# Patient Record
Sex: MALE | Race: WHITE | URBAN - METROPOLITAN AREA
[De-identification: names, ages, dates, MRNs, and addresses within clinical notes are randomized per-mention and may not be internally consistent; named-entity substitution may affect disease eponyms.]

---

## 2017-03-06 ENCOUNTER — ALLIED HEALTH/NURSE VISIT (OUTPATIENT)
Dept: TRANSPLANT | Facility: CLINIC | Age: 57
End: 2017-03-06
Attending: INTERNAL MEDICINE
Payer: COMMERCIAL

## 2017-03-06 ENCOUNTER — RESULTS ONLY (OUTPATIENT)
Dept: OTHER | Facility: CLINIC | Age: 57
End: 2017-03-06

## 2017-03-06 VITALS
SYSTOLIC BLOOD PRESSURE: 126 MMHG | DIASTOLIC BLOOD PRESSURE: 73 MMHG | HEART RATE: 70 BPM | RESPIRATION RATE: 18 BRPM | WEIGHT: 153.7 LBS | OXYGEN SATURATION: 94 % | TEMPERATURE: 96.9 F

## 2017-03-06 DIAGNOSIS — C90.00 MULTIPLE MYELOMA NOT HAVING ACHIEVED REMISSION (H): Primary | ICD-10-CM

## 2017-03-06 DIAGNOSIS — Z71.9 ENCOUNTER FOR COUNSELING: Primary | ICD-10-CM

## 2017-03-06 PROCEDURE — 36415 COLL VENOUS BLD VENIPUNCTURE: CPT

## 2017-03-06 PROCEDURE — 81370 HLA I & II TYPING LR: CPT | Performed by: INTERNAL MEDICINE

## 2017-03-06 PROCEDURE — 81376 HLA II TYPING 1 LOCUS LR: CPT | Performed by: INTERNAL MEDICINE

## 2017-03-06 PROCEDURE — 99213 OFFICE O/P EST LOW 20 MIN: CPT | Mod: ZF

## 2017-03-06 RX ORDER — DOCUSATE SODIUM 100 MG/1
CAPSULE, LIQUID FILLED ORAL 2 TIMES DAILY PRN
COMMUNITY

## 2017-03-06 RX ORDER — CHLORAL HYDRATE 500 MG
2 CAPSULE ORAL DAILY
COMMUNITY

## 2017-03-06 RX ORDER — OXYCODONE HYDROCHLORIDE 5 MG/1
TABLET ORAL EVERY 4 HOURS PRN
COMMUNITY

## 2017-03-06 RX ORDER — SILDENAFIL 100 MG/1
TABLET, FILM COATED ORAL DAILY PRN
COMMUNITY

## 2017-03-06 RX ORDER — AMOXICILLIN 250 MG
1-2 CAPSULE ORAL DAILY
COMMUNITY

## 2017-03-06 RX ORDER — ATORVASTATIN CALCIUM 20 MG/1
20 TABLET, FILM COATED ORAL DAILY
COMMUNITY

## 2017-03-06 RX ORDER — VITAMIN E 268 MG
400 CAPSULE ORAL
COMMUNITY

## 2017-03-06 RX ORDER — TIZANIDINE HYDROCHLORIDE 4 MG/1
4 CAPSULE, GELATIN COATED ORAL
COMMUNITY

## 2017-03-06 ASSESSMENT — PAIN SCALES - GENERAL: PAINLEVEL: MILD PAIN (3)

## 2017-03-06 NOTE — PROGRESS NOTES
Blood and Marrow Transplant   New Transplant Visit with   Clinical     Assessment completed on 3/6/2017 of living situation, support system, financial status, functional status, coping, stressors, need for resources and social work intervention provided as needed. Information for this assessment was provided by pt and pt's spouse-Abimael patel in addition to medical chart review and consultation with medical team.     Present:  Patient: Jonathan Frederick  Spouse: Abimael Pardo  : TAY Mena, [facility]    Medical Team   Nurse Coordinator: Xiomara Mckenzie RN  BMT Physician: Luis Antonio Orosco MD  Referring Physician: Leonel Berry MD    Presenting Information:  Pt is a 56 year old male diagnosed with Multiple Myeloma. Pt presents for autologous stem cell transplant discussion.    Contact Information:  Cell Phone: [phone]  Home Phone: [phone]    Special Needs: No needs identified at this time. Pt lives in [place] which is within the required distance of the hospital. Pt does not need to relocate.     Family Information:   Spouse: Abimael Pardo  Parents: Mother lives in the area; Father   Siblings: 2 Sisters  Children: None    Education/Employment:  Currently employed: Yes  Employer: [employer]    Spouse/Partner Employed: Yes  Employer: [employer]    Finances/Insurance: Pt explained that he received a call from [employer]Mela James stating that his insurance may not be in-network with [facility] and pt may have to go to [facility]. [employer]  is going to call pt back and provide update on insurance information.    Additionally, pt also discussed that he is on Short Term Disability, unpaid, through his work; however, he is being paid through PTO. [employer] discussed [employer] and provided blank application. SW encouraged pt provide application to pt's  Oncology Clinic . SW explained that the yasmani is based on tx area and income based.     Source of Income: PTO    [employer] discussed yasmani options and asked pt to let SW know if they would like to apply in the future. SW provided information regarding the insurance authorization process and the role of the BMT financial case-mangers. SW provided contact info for the [employer] financial case-managers and referred pt to them for future insurance questions.     Caregiver:   SW discussed with the pt and pt's spouse-Abimael the caregiver role and expectation at length. Pt is agreeable to having a full time caregiver for the minimum of 30 days until cleared by the BMT Physician. Pt's identified caregiver is pt's spouse-Abimael. Pt and pt's spouse did not have any concerns or feel there were any limitations to this requirement. Caregiver education and information provided. No caregiver concerns identified.     Healthcare Directive: No. SW provided education and forms. SW encouraged pt to have discussions with their family regarding their health care wishes.     Resources Provided:  BMT Information Book  BMT Resources Packet  Healthcare Directive  Transplant Unit Description and Information     Identified Concerns:  No concerns identified at this time.     Summary:  Pt presents to [facility] regarding an autologous stem cell transplant. Pt and pt's spouse asked good/appropriate questions regarding psychosocial factors related to BMT; all questions were addressed. Pt presented as calm and pleasant. No caregiver concerns identified. Pt and pt's spouse would not need to relocate if pt was able to receive BMT at [facility]. Pt is waiting to hear from [employer]Mela James to see if pt's insurance is in-network.    Plan:   SW provided contact information and encouraged pt to contact [employer] with any additional questions, concerns, resources and/or for support. [employer] will  continue to follow pt to provide support and guidance with resources as needed.     TAY Mena, [facility]  Phone: [phone]  Pager: [phone]

## 2017-03-06 NOTE — MR AVS SNAPSHOT
"              After Visit Summary   3/6/2017    Jonathan Frederick    MRN: [id number]           Patient Information     Date Of Birth          1960        Visit Information        Provider Department      3/6/2017 7:30 AM Luis Antonio Orosco MD [facility] Blood and Marrow Transplant        Today's Diagnoses     Multiple myeloma not having achieved remission (H)    -  1          Clinics and Surgery Center ([facility]  [address] [place] [address]  Phone: [phone]  Clinic Hours:   Monday-Friday:    8am to 4:30pm   Weekends and holidays:    8am to noon (in general)  If your fever is 100.5  or greater,   call the clinic.  After hours call the   hospital at [phone] or   [phone]. Ask for the BMT   fellow for pediatric or adult patients            Follow-ups after your visit        Who to contact     If you have questions or need follow up information about today's clinic visit or your schedule please contact [facility] BLOOD AND MARROW TRANSPLANT directly at [phone].  Normal or non-critical lab and imaging results will be communicated to you by MOOVIAhart, letter or phone within 4 business days after the clinic has received the results. If you do not hear from us within 7 days, please contact the clinic through [employer] or phone. If you have a critical or abnormal lab result, we will notify you by phone as soon as possible.  Submit refill requests through [employer] or call your pharmacy and they will forward the refill request to us. Please allow 3 business days for your refill to be completed.          Additional Information About Your Visit        MOOVIAhart Information     [employer] lets you send messages to your doctor, view your test results, renew your prescriptions, schedule appointments and more. To sign up, go to [email or web address] . Click on \"Log in\" on the left side of the screen, which will take you to the Welcome page. Then click on \"Sign up Now\" on the right side of the page.     You " will be asked to enter the access code listed below, as well as some personal information. Please follow the directions to create your username and password.     Your access code is: [email or web address]  Expires: 2017  6:30 AM     Your access code will  in 90 days. If you need help or a new code, please call your [place] clinic or [phone].        Care EveryWhere ID     This is your Care EveryWhere ID. This could be used by other organizations to access your [place] medical records  [id number]        Your Vitals Were     Pulse Temperature Respirations Pulse Oximetry          70 96.9  F (36.1  C) (Oral) 18 94%         Blood Pressure from Last 3 Encounters:   17 126/73    Weight from Last 3 Encounters:   17 69.7 kg (153 lb 11.2 oz)              We Performed the Following     HLA Typing Complete BMT Recipient        Recent Review Flowsheet Data     There is no flowsheet data to display.               Primary Care Provider    None Specified       No primary provider on file.        Thank you!     Thank you for choosing [facility] BLOOD AND MARROW TRANSPLANT  for your care. Our goal is always to provide you with excellent care. Hearing back from our patients is one way we can continue to improve our services. Please take a few minutes to complete the written survey that you may receive in the mail after your visit with us. Thank you!             Your Updated Medication List - Protect others around you: Learn how to safely use, store and throw away your medicines at www.disposemymeds.org.          This list is accurate as of: 3/6/17 11:59 PM.  Always use your most recent med list.                   Brand Name Dispense Instructions for use    COLACE 100 MG capsule   Generic drug:  docusate sodium      Take by mouth 2 times daily as needed for constipation       fish oil-omega-3 fatty acids 1000 MG capsule      Take 2 g by mouth daily 4 caps by mouth once daily       GLUCOPHAGE XR PO      Take 500 mg by  mouth daily       LIPITOR 20 MG tablet   Generic drug:  atorvastatin      Take 20 mg by mouth daily       * MS CONTIN PO      Take 15 mg by mouth every 12 hours       * MORPHINE SULFATE PO      Take 30 mg by mouth Immediate release. 1 tablet by mouth 2 times daily       ROXICODONE 5 MG IR tablet   Generic drug:  oxyCODONE      Take by mouth every 4 hours as needed for moderate to severe pain 1-2 tablets as needed for pain       senna-docusate 8.6-50 MG per tablet    SENOKOT-S;PERICOLACE     Take 1-2 tablets by mouth daily       SG ENTERIC COATED ASPIRIN PO      Take 325 mg by mouth Take 1 tablet by mouth 2 times daily with meals.       VIAGRA 100 MG cap/tab   Generic drug:  sildenafil      Take by mouth daily as needed for erectile dysfunction       vitamin E 400 UNIT capsule      Take 400 Units by mouth Take twice weekly, on Tuesdays and Fridays.       ZANAFLEX 4 MG capsule   Generic drug:  tiZANidine      Take 4 mg by mouth Take 1 tablet by mouth every 6 hours  As needed for muscle spasms.       * Notice:  This list has 2 medication(s) that are the same as other medications prescribed for you. Read the directions carefully, and ask your doctor or other care provider to review them with you.

## 2017-03-06 NOTE — LETTER
3/6/2017       RE: Jonathan Frederick  [address]  [place] [address]     Dear Colleague,    Thank you for referring your patient, Jonathan Frederick, to the [facility] BLOOD AND MARROW TRANSPLANT at [facility]. Please see a copy of my visit note below.    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  FISH neg for OI41h27.1 loss, loss of T006042 (13q14.3), IgH reaarangement  On first cycle of RVD    , a very pleasant person, came to the clinic for his first vist to sergio about hematopoietic cell transplantation (HCT).  He had had pain pain in some different places in his body. MRI was obtained in jan 2016 showing extensive osseous lesions throughout the throsic vertebrae, pathologic wedge-shaped compression fracture at T3, similar destruction at T11, no spinal canal compromise. Bone survey showed B rib fractures, innumerable lytic lesions throughout the axial and appendicular skeleton. On 1/31/2017 serum KLC 1.51, Lambda Light chain 20.7 mg/dL, K/L ratio 0.07. Serum M spike 2.8, 24-h urine 768 mg protein, M spike 30%No results found for: Serum albumin 3.6, T.pro 9.7, T.megan 0.7, Fts WNL, WBC 7000, Hb 10.8, lt 227,000.  cre 1.53, uric acid 8.5    BM biopsy was done showing hypercellualr marrow (60-80%) with 60-70% plasma cells postive for CD 38, 45, lambda    ROS: + decreased appetite, pain in the R toe (gout) improving, decreased energy, pain in back and in ribs, other sysmtes are negative.    PMH: Type II DM on metformin  Hypercholesterolemia    Current Outpatient Prescriptions   Medication     SG ENTERIC COATED ASPIRIN PO     atorvastatin (LIPITOR) 20 MG tablet     docusate sodium (COLACE) 100 MG capsule     fish oil-omega-3 fatty acids 1000 MG capsule     MetFORMIN HCl (GLUCOPHAGE XR PO)     Morphine Sulfate (MS  CONTIN PO)     MORPHINE SULFATE PO     oxyCODONE (ROXICODONE) 5 MG IR tablet     senna-docusate (SENOKOT-S;PERICOLACE) 8.6-50 MG per tablet     sildenafil (VIAGRA) 100 MG cap/tab     tiZANidine (ZANAFLEX) 4 MG capsule     vitamin E 400 UNIT capsule     No current facility-administered medications for this visit.        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 Agressive means also without therapy it would threaten his life in months-years due to complications including bone marrow related (neutropenia, anemia, thormbocytopenia), infections, bone-related, kidney-related.    I explained blood cells and their primary functions; WBC -prevention of infections, providing immune system; RBC carrying O2; Platelets preventing/stopping hemorrhage. I explained stem cells are the cels make entire blood cells and present in the bone marrow. Here in MM uncontrolled growth of neoplastic myeloma cells cause pancytopenia and thereofre associated complications. His penumonia in July perhaps part of inial sing of MM.    I explained paraporotein and related issues, including kidnye failures that might be presentation in many patients.    I explained osteopenia, porosis, lytic lesions and fracture. He did have at diagnosis, and this was his initial symptom-back pain. He recently had surgery for L femur fixation.      Chemotherapies are neccessary for MM. Fortunately we have had more effective thrapies over 2 decades. He has  been started on efficacious RVD. His response has been good. We here at [facility] like to see VGPR or Near CRs before autoHCT.; however they are not targeting only myeloma cells but also stem cells. Therefore each cycle he develops cytopenias but recovers. I mentioned that after very high dose chemotherapy (myeloabaltive dose of melphalan in this case), too many stem cells will be dead so he could not surive because of no adequate blood cells and die of infections, bleeding or MI/CHF etc. To bypass this problem, stem cells can be collected (I explained it can be collected from either BM or PB, we prefer PB; I explained entire procedure, growth factor with or without chemo depending upon his condition and disease after  Cytoxan cycle apheresis cath placement-he has Michael now- apheresis procedure-outpatient 4-5 hours, 2-5 days depending upon BM reserve) before given high dose chemotherapy (I explained chemo-high dose melphalan clinically equal to chemo alone, chemo alne ight have less long term side effects). After chemotherapy (conditioning) is complete we will transfuse his own stem cells. 2 weeks or so cytopenic early phase will be followed by increase in his blood counts due to produced blood cells by stem cells.    Short-term side effects: GIT symptoms, diarrhea N/V, dehydration, need for TPN, fevers infections; many patients readdmitted to treat these. Infections can be rarely but life threatenning. Organ dysfunctions, ARF can occur (she will undergo pretransplant work-up for organ function eval before transplant; and she will receive prophy antibiotics but still can have these potential complications).    There might be also long-term complications :secondary cancers, MDS, skin cancers etc; cardiac dysfunction, hypothyroidism etc.  There is slim, fortunately, chance of mortality, 5%. Juan Ramon I have not seen a treatment related mortality over 8 years.    I also explained that he has to respond to benefit from  auto HCT, if her response < RI, we may have to change his chemo regimen and then followed by alloHCT.     I explained what is alloHCT (from HLA matched sisters or if not matched, then matched URD or haploidentical alloHCT) ; I explained How it works (GVM) and main complications (GVHD, immunosuppressive drug use for a long time and infections mainly). Briefly I noted that this is more complicated but maybe more effective; however it is much more toxic and head to head comaprisons between auto and alloHCT did not show superior effect of alloHCT given its very high toxicity. mortality just because of complications may be up to 20%-30% so in MM. Overall its chance of proving disease control and long term survival is 20%.     Therefore as long as he continues to have response, next therapy is autoHCT when M spike is around 0.5 g/dL stage therapy.     When hre progress after autoHCT, consider  alloHCT BMT CTN study. I noted, complications of alloHCT and efficacy reasons, Ia still consider autoHCT first.    Continue zometa  Try to stay away from NSDAIDs,  Consider Allopurinol    Plan recommended:  1-Cont VRD, When he has a good response M spike is around 0.5 g/dL, we will take over him here to do pretransplant workup   2-Cytoxan -GCSF stem cell collection followed by stem cell collection (for 2 transplant if possible)  3-Auto HCT (not cures but for to prolonging survival and improving QOL)  4-Lenolidomide maintenance  5-Type him and sisters for HLA  I already discussed with Dr. Berry      Again, thank you for allowing me to participate in the care of your patient.      Sincerely,    Luis Antonio Orosco MD      CC: Dr. Leonel Berry  [facility]

## 2017-03-06 NOTE — MR AVS SNAPSHOT
"              After Visit Summary   3/6/2017    Jonathan Frederick    MRN: [id number]           Patient Information     Date Of Birth          1960        Visit Information        Provider Department      3/6/2017 9:00 AM Coordinator, Mariely Bmt Nurse;  2 [address] CONSULT [facility] Blood and Marrow Transplant        Today's Diagnoses     Multiple myeloma not having achieved remission (H)    -  1          Clinics and Surgery Center ([facility]  [address] [place] [address]  Phone: [phone]  Clinic Hours:   Monday-Friday:    8am to 4:30pm   Weekends and holidays:    8am to noon (in general)  If your fever is 100.5  or greater,   call the clinic.  After hours call the   hospital at [phone] or   [phone]. Ask for the BMT   fellow for pediatric or adult patients            Follow-ups after your visit        Who to contact     If you have questions or need follow up information about today's clinic visit or your schedule please contact [facility] BLOOD AND MARROW TRANSPLANT directly at [phone].  Normal or non-critical lab and imaging results will be communicated to you by [employer], letter or phone within 4 business days after the clinic has received the results. If you do not hear from us within 7 days, please contact the clinic through [employer] or phone. If you have a critical or abnormal lab result, we will notify you by phone as soon as possible.  Submit refill requests through [employer] or call your pharmacy and they will forward the refill request to us. Please allow 3 business days for your refill to be completed.          Additional Information About Your Visit        TalkShoehart Information     [employer] lets you send messages to your doctor, view your test results, renew your prescriptions, schedule appointments and more. To sign up, go to [email or web address] . Click on \"Log in\" on the left side of the screen, which will take you to the Welcome page. Then click on \"Sign up Now\" on the right " side of the page.     You will be asked to enter the access code listed below, as well as some personal information. Please follow the directions to create your username and password.     Your access code is: [email or web address]  Expires: 2017  6:30 AM     Your access code will  in 90 days. If you need help or a new code, please call your [place] clinic or [phone].        Care EveryWhere ID     This is your Care EveryWhere ID. This could be used by other organizations to access your [place] medical records  [id number]         Blood Pressure from Last 3 Encounters:   17 126/73    Weight from Last 3 Encounters:   17 69.7 kg (153 lb 11.2 oz)              Today, you had the following     No orders found for display       Recent Review Flowsheet Data     There is no flowsheet data to display.               Primary Care Provider    None Specified       No primary provider on file.        Thank you!     Thank you for choosing [facility] BLOOD AND MARROW TRANSPLANT  for your care. Our goal is always to provide you with excellent care. Hearing back from our patients is one way we can continue to improve our services. Please take a few minutes to complete the written survey that you may receive in the mail after your visit with us. Thank you!             Your Updated Medication List - Protect others around you: Learn how to safely use, store and throw away your medicines at www.disposemymeds.org.          This list is accurate as of: 3/6/17 11:59 PM.  Always use your most recent med list.                   Brand Name Dispense Instructions for use    COLACE 100 MG capsule   Generic drug:  docusate sodium      Take by mouth 2 times daily as needed for constipation       fish oil-omega-3 fatty acids 1000 MG capsule      Take 2 g by mouth daily 4 caps by mouth once daily       GLUCOPHAGE XR PO      Take 500 mg by mouth daily       LIPITOR 20 MG tablet   Generic drug:  atorvastatin      Take 20 mg by mouth  daily       * MS CONTIN PO      Take 15 mg by mouth every 12 hours       * MORPHINE SULFATE PO      Take 30 mg by mouth Immediate release. 1 tablet by mouth 2 times daily       ROXICODONE 5 MG IR tablet   Generic drug:  oxyCODONE      Take by mouth every 4 hours as needed for moderate to severe pain 1-2 tablets as needed for pain       senna-docusate 8.6-50 MG per tablet    SENOKOT-S;PERICOLACE     Take 1-2 tablets by mouth daily       SG ENTERIC COATED ASPIRIN PO      Take 325 mg by mouth Take 1 tablet by mouth 2 times daily with meals.       VIAGRA 100 MG cap/tab   Generic drug:  sildenafil      Take by mouth daily as needed for erectile dysfunction       vitamin E 400 UNIT capsule      Take 400 Units by mouth Take twice weekly, on Tuesdays and Fridays.       ZANAFLEX 4 MG capsule   Generic drug:  tiZANidine      Take 4 mg by mouth Take 1 tablet by mouth every 6 hours  As needed for muscle spasms.       * Notice:  This list has 2 medication(s) that are the same as other medications prescribed for you. Read the directions carefully, and ask your doctor or other care provider to review them with you.

## 2017-03-06 NOTE — NURSING NOTE
Chief Complaint   Patient presents with     Consult     New evaluation here for provider       Zarina Martinez CMA March 6, 2017 8:16 AM  Medications and allergies reviewed. Vitals done see flow sheets. Face to face time 11 minutes.

## 2017-03-06 NOTE — MR AVS SNAPSHOT
"              After Visit Summary   3/6/2017    Jonathan Frederick    MRN: [id number]           Patient Information     Date Of Birth          1960        Visit Information        Provider Department      3/6/2017 9:30 AM Aure Recinos MSW;  2 116 CONSULT [facility] Blood and Marrow Transplant        Today's Diagnoses     Encounter for counseling    -  1          [facility] ([facility])  [address] [place] [address]  Phone: [phone]  Clinic Hours:   Monday-Friday:    8am to 4:30pm   Weekends and holidays:    8am to noon (in general)  If your fever is 100.5  or greater,   call the clinic.  After hours call the   hospital at [phone] or   [phone]. Ask for the BMT   fellow for pediatric or adult patients            Follow-ups after your visit        Who to contact     If you have questions or need follow up information about today's clinic visit or your schedule please contact [facility] B[facility]OD [facility] MARROW TRANSPLANT directly at [phone].  Normal or non-critical lab and imaging results will be communicated to you by [employer], letter or phone within 4 business days after the clinic has received the results. If you do not hear from us within 7 days, please contact the clinic through [employer] or phone. If you have a critical or abnormal lab result, we will notify you by phone as soon as possible.  Submit refill requests through [facility] or call your pharmacy and they will forward the refill request to us. Please allow 3 business days for your refill to be completed.          Additional Information About Your Visit        [employer] Information     [facility] lets you send messages to your doctor, view your test results, renew your prescriptions, schedule appointments and more. To sign up, go to [email or web address] . Click on \"Log in\" on the left side of the screen, which will take you to the Welcome page. Then click on \"Sign up Now\" on the right side of the page.     You " will be asked to enter the access code listed below, as well as some personal information. Please follow the directions to create your username and password.     Your access code is: [email or web address]  Expires: 2017  6:30 AM     Your access code will  in 90 days. If you need help or a new code, please call your [place] clinic or [phone].        Care EveryWhere ID     This is your Care EveryWhere ID. This could be used by other organizations to access your [facility] medical records  [id number]         Blood Pressure from Last 3 Encounters:   17 126/73    Weight from Last 3 Encounters:   17 69.7 kg (153 lb 11.2 oz)              Today, you had the following     No orders found for display       Recent Review Flowsheet Data     There is no flowsheet data to display.               Primary Care Provider    None Specified       No primary provider on file.        Thank you!     Thank you for choosing [facility]ARR[facility]  for your care. Our goal is always to provide you with excellent care. Hearing back from our patients is one way we can continue to improve our services. Please take a few minutes to complete the written survey that you may receive in the mail after your visit with us. Thank you!             Your Updated Medication List - Protect others around you: Learn how to safely use, store and throw away your medicines at www.disposemymeds.org.          This list is accurate as of: 3/6/17  9:59 AM.  Always use your most recent med list.                   Brand Name Dispense Instructions for use    COLACE 100 MG capsule   Generic drug:  docusate sodium      Take by mouth 2 times daily as needed for constipation       fish oil-omega-3 fatty acids 1000 MG capsule      Take 2 g by mouth daily 4 caps by mouth once daily       GLUCOPHAGE XR PO      Take 500 mg by mouth daily       LIPITOR 20 MG tablet   Generic drug:  atorvastatin      Take 20 mg by mouth daily       * MS CONTIN PO       Take 15 mg by mouth every 12 hours       * MORPHINE SULFATE PO      Take 30 mg by mouth Immediate release. 1 tablet by mouth 2 times daily       ROXICODONE 5 MG IR tablet   Generic drug:  oxyCODONE      Take by mouth every 4 hours as needed for moderate to severe pain 1-2 tablets as needed for pain       senna-docusate 8.6-50 MG per tablet    SENOKOT-S;PERICOLACE     Take 1-2 tablets by mouth daily       SG ENTERIC COATED ASPIRIN PO      Take 325 mg by mouth Take 1 tablet by mouth 2 times daily with meals.       VIAGRA 100 MG cap/tab   Generic drug:  sildenafil      Take by mouth daily as needed for erectile dysfunction       vitamin E 400 UNIT capsule      Take 400 Units by mouth Take twice weekly, on Tuesdays and Fridays.       ZANAFLEX 4 MG capsule   Generic drug:  tiZANidine      Take 4 mg by mouth Take 1 tablet by mouth every 6 hours  As needed for muscle spasms.       * Notice:  This list has 2 medication(s) that are the same as other medications prescribed for you. Read the directions carefully, and ask your doctor or other care provider to review them with you.

## 2017-03-07 LAB — HLA TYPING COMPLETE BMT RECIPIENT: NORMAL

## 2017-03-07 NOTE — PROGRESS NOTES
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  FISH neg for AW49r54.1 loss, loss of U627261 (13q14.3), IgH reaarangement  On first cycle of RVD    , a very pleasant person, came to the clinic for his first vist to sergio about hematopoietic cell transplantation (HCT).  He had had pain pain in some different places in his body. MRI was obtained in jan 2016 showing extensive osseous lesions throughout the throsic vertebrae, pathologic wedge-shaped compression fracture at T3, similar destruction at T11, no spinal canal compromise. Bone survey showed B rib fractures, innumerable lytic lesions throughout the axial and appendicular skeleton. On 1/31/2017 serum KLC 1.51, Lambda Light chain 20.7 mg/dL, K/L ratio 0.07. Serum M spike 2.8, 24-h urine 768 mg protein, M spike 30%No results found for: Serum albumin 3.6, T.pro 9.7, T.megan 0.7, Fts WNL, WBC 7000, Hb 10.8, lt 227,000.  cre 1.53, uric acid 8.5    BM biopsy was done showing hypercellualr marrow (60-80%) with 60-70% plasma cells postive for CD 38, 45, lambda    ROS: + decreased appetite, pain in the R toe (gout) improving, decreased energy, pain in back and in ribs, other sysmtes are negative.    PMH: Type II DM on metformin  Hypercholesterolemia    Current Outpatient Prescriptions   Medication     SG ENTERIC COATED ASPIRIN PO     atorvastatin (LIPITOR) 20 MG tablet     docusate sodium (COLACE) 100 MG capsule     fish oil-omega-3 fatty acids 1000 MG capsule     MetFORMIN HCl (GLUCOPHAGE XR PO)     Morphine Sulfate (MS CONTIN PO)     MORPHINE SULFATE PO     oxyCODONE (ROXICODONE) 5 MG IR tablet     senna-docusate (SENOKOT-S;PERICOLACE) 8.6-50 MG per tablet     sildenafil (VIAGRA) 100 MG cap/tab     tiZANidine (ZANAFLEX) 4 MG capsule     vitamin E 400 UNIT capsule     No current facility-administered medications for this  visit.        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 Agressive means also without therapy it would threaten his life in months-years due to complications including bone marrow related (neutropenia, anemia, thormbocytopenia), infections, bone-related, kidney-related.    I explained blood cells and their primary functions; WBC -prevention of infections, providing immune system; RBC carrying O2; Platelets preventing/stopping hemorrhage. I explained stem cells are the cels make entire blood cells and present in the bone marrow. Here in MM uncontrolled growth of neoplastic myeloma cells cause pancytopenia and thereofre associated complications. His penumonia in July perhaps part of inial sing of MM.    I explained paraporotein and related issues, including kidnye failures that might be presentation in many patients.    I explained osteopenia, porosis, lytic lesions and fracture. He did have at diagnosis, and this was his initial symptom-back pain. He recently had surgery for L femur fixation.      Chemotherapies are neccessary for MM. Fortunately we have had more effective thrapies over 2 decades. He has been started on efficacious RVD. His response has been good. We here at [facility] like to see VGPR or Near CRs before autoHCT.; however they are not targeting only myeloma cells but also stem cells. Therefore each cycle he develops cytopenias but recovers. I mentioned that after very high dose chemotherapy  (myeloabaltive dose of melphalan in this case), too many stem cells will be dead so he could not surive because of no adequate blood cells and die of infections, bleeding or MI/CHF etc. To bypass this problem, stem cells can be collected (I explained it can be collected from either BM or PB, we prefer PB; I explained entire procedure, growth factor with or without chemo depending upon his condition and disease after  Cytoxan cycle apheresis cath placement-he has Rodriguez now- apheresis procedure-outpatient 4-5 hours, 2-5 days depending upon BM reserve) before given high dose chemotherapy (I explained chemo-high dose melphalan clinically equal to chemo alone, chemo alne ight have less long term side effects). After chemotherapy (conditioning) is complete we will transfuse his own stem cells. 2 weeks or so cytopenic early phase will be followed by increase in his blood counts due to produced blood cells by stem cells.    Short-term side effects: GIT symptoms, diarrhea N/V, dehydration, need for TPN, fevers infections; many patients readdmitted to treat these. Infections can be rarely but life threatenning. Organ dysfunctions, ARF can occur (she will undergo pretransplant work-up for organ function eval before transplant; and she will receive prophy antibiotics but still can have these potential complications).    There might be also long-term complications :secondary cancers, MDS, skin cancers etc; cardiac dysfunction, hypothyroidism etc.  There is slim, fortunately, chance of mortality, 5%. Juan Ramon I have not seen a treatment related mortality over 8 years.    I also explained that he has to respond to benefit from auto HCT, if her response < NC, we may have to change his chemo regimen and then followed by alloHCT.     I explained what is alloHCT (from HLA matched sisters or if not matched, then matched URD or haploidentical alloHCT) ; I explained How it works (GVM) and main complications (GVHD, immunosuppressive  drug use for a long time and infections mainly). Briefly I noted that this is more complicated but maybe more effective; however it is much more toxic and head to head comaprisons between auto and alloHCT did not show superior effect of alloHCT given its very high toxicity. mortality just because of complications may be up to 20%-30% so in MM. Overall its chance of proving disease control and long term survival is 20%.     Therefore as long as he continues to have response, next therapy is autoHCT when M spike is around 0.5 g/dL stage therapy.     When hre progress after autoHCT, consider  alloHCT BMT CTN study. I noted, complications of alloHCT and efficacy reasons, Ia still consider autoHCT first.    Continue zometa  Try to stay away from NSDAIDs,  Consider Allopurinol    Plan recommended:  1-Cont VRD, When he has a good response M spike is around 0.5 g/dL, we will take over him here to do pretransplant workup   2-Cytoxan -GCSF stem cell collection followed by stem cell collection (for 2 transplant if possible)  3-Auto HCT (not cures but for to prolonging survival and improving QOL)  4-Lenolidomide maintenance  5-Type him and sisters for HLA  I already discussed with Dr. Berry  CC: Dr. Leonel Berry  [facility]

## 2017-03-09 LAB
A* LOCUS: NORMAL
ABTEST METHOD: NORMAL
B* LOCUS: NORMAL
B*: NORMAL
BW-1: NORMAL
C* LOCUS: NORMAL
C*: NORMAL
DPA1*: NORMAL
DPB1*: NORMAL
DQA1*: NORMAL
DQA1*LOCUS: NORMAL
DQB1* LOCUS: NORMAL
DQB1*: NORMAL
DRB1* LOCUS: NORMAL
DRB1*: NORMAL
DRB5* LOCUS: NORMAL
DRSSO TEST METHOD: NORMAL

## 2017-03-09 NOTE — PROGRESS NOTES
Met with patient after new evaluation with Dr Orosco regarding plan and BMT nurse coordinator role. Provided patient with Robert Torre's contact information for future questions and plan. Patient verbalized understanding of provided information.

## 2017-03-24 PROCEDURE — 00000345 ZZHCL STATISTIC REV BONE MARROW OUTSIDE SLIDES TC 88321: Performed by: INTERNAL MEDICINE

## 2017-03-28 LAB — COPATH REPORT: NORMAL
